# Patient Record
Sex: FEMALE | Race: BLACK OR AFRICAN AMERICAN | NOT HISPANIC OR LATINO | Employment: OTHER | URBAN - METROPOLITAN AREA
[De-identification: names, ages, dates, MRNs, and addresses within clinical notes are randomized per-mention and may not be internally consistent; named-entity substitution may affect disease eponyms.]

---

## 2023-10-06 PROBLEM — H93.19 TINNITUS: Status: ACTIVE | Noted: 2021-05-17

## 2023-10-06 PROBLEM — H92.03 OTALGIA, BILATERAL: Status: ACTIVE | Noted: 2023-10-06

## 2023-10-06 PROBLEM — H90.3 BILATERAL SENSORINEURAL HEARING LOSS: Status: ACTIVE | Noted: 2023-10-06

## 2023-10-06 PROBLEM — M10.9 GOUT: Status: ACTIVE | Noted: 2021-02-19

## 2023-10-06 PROBLEM — R13.12 DYSPHAGIA, OROPHARYNGEAL PHASE: Status: ACTIVE | Noted: 2023-10-06

## 2023-10-06 PROBLEM — R52 BODY ACHES: Status: ACTIVE | Noted: 2023-10-06

## 2023-10-06 PROBLEM — K21.9 GERD (GASTROESOPHAGEAL REFLUX DISEASE): Status: ACTIVE | Noted: 2023-10-06

## 2023-10-06 PROBLEM — F41.1 ANXIETY STATE: Status: ACTIVE | Noted: 2021-03-12

## 2023-10-06 PROBLEM — I10 ESSENTIAL HYPERTENSION: Status: ACTIVE | Noted: 2021-02-19

## 2023-10-06 PROBLEM — E55.9 VITAMIN D DEFICIENCY: Status: ACTIVE | Noted: 2021-02-19

## 2023-10-06 PROBLEM — R07.9 CHEST PAIN: Status: ACTIVE | Noted: 2023-02-23

## 2023-10-06 PROBLEM — R09.81 SINUS CONGESTION: Status: ACTIVE | Noted: 2023-10-06

## 2023-10-06 PROBLEM — R13.10 DYSPHAGIA: Status: ACTIVE | Noted: 2021-02-19

## 2023-10-06 RX ORDER — CHOLECALCIFEROL (VITAMIN D3) 50 MCG
2000 TABLET ORAL DAILY
COMMUNITY
Start: 2020-06-09 | End: 2024-03-13 | Stop reason: SDUPTHER

## 2023-10-06 RX ORDER — PANTOPRAZOLE SODIUM 40 MG/1
40 TABLET, DELAYED RELEASE ORAL DAILY
COMMUNITY
Start: 2022-06-30 | End: 2024-03-13 | Stop reason: SDUPTHER

## 2023-10-06 RX ORDER — ATORVASTATIN CALCIUM 40 MG/1
40 TABLET, FILM COATED ORAL DAILY
COMMUNITY
End: 2024-03-13 | Stop reason: SDUPTHER

## 2023-10-06 RX ORDER — AMLODIPINE BESYLATE 5 MG/1
TABLET ORAL
COMMUNITY
End: 2024-03-13 | Stop reason: SDUPTHER

## 2023-10-06 RX ORDER — LOSARTAN POTASSIUM 50 MG/1
50 TABLET ORAL DAILY
COMMUNITY
Start: 2020-04-09 | End: 2024-03-13

## 2023-10-06 RX ORDER — CLINDAMYCIN HYDROCHLORIDE 150 MG/1
CAPSULE ORAL
COMMUNITY
Start: 2023-03-30 | End: 2023-10-09 | Stop reason: WASHOUT

## 2023-10-06 RX ORDER — HYDROCORTISONE 25 MG/G
OINTMENT TOPICAL
COMMUNITY
Start: 2023-04-18

## 2023-10-06 RX ORDER — IBUPROFEN 800 MG/1
800 TABLET ORAL EVERY 8 HOURS PRN
COMMUNITY
Start: 2022-10-06 | End: 2024-03-13 | Stop reason: SDUPTHER

## 2023-10-06 RX ORDER — MELOXICAM 15 MG/1
1 TABLET ORAL DAILY
COMMUNITY
Start: 2022-06-03

## 2023-10-09 ENCOUNTER — OFFICE VISIT (OUTPATIENT)
Dept: PRIMARY CARE | Facility: CLINIC | Age: 78
End: 2023-10-09
Payer: MEDICARE

## 2023-10-09 VITALS
SYSTOLIC BLOOD PRESSURE: 124 MMHG | TEMPERATURE: 96.7 F | DIASTOLIC BLOOD PRESSURE: 70 MMHG | OXYGEN SATURATION: 98 % | HEART RATE: 68 BPM

## 2023-10-09 DIAGNOSIS — F41.9 ANXIETY: ICD-10-CM

## 2023-10-09 DIAGNOSIS — F03.A4 MILD DEMENTIA WITH ANXIETY, UNSPECIFIED DEMENTIA TYPE (MULTI): Primary | ICD-10-CM

## 2023-10-09 DIAGNOSIS — R21 RASH: ICD-10-CM

## 2023-10-09 PROBLEM — G93.41 ACUTE METABOLIC ENCEPHALOPATHY: Status: ACTIVE | Noted: 2021-12-18

## 2023-10-09 PROBLEM — N30.00 ACUTE CYSTITIS: Status: ACTIVE | Noted: 2019-07-16

## 2023-10-09 PROBLEM — E11.9 DIABETES MELLITUS TYPE 2, NONINSULIN DEPENDENT (MULTI): Status: ACTIVE | Noted: 2021-12-18

## 2023-10-09 PROBLEM — R20.9 ABNORMAL SENSATION OF UPPER EXTREMITY: Status: ACTIVE | Noted: 2018-08-31

## 2023-10-09 PROBLEM — R41.81 AGE-RELATED COGNITIVE DECLINE: Status: ACTIVE | Noted: 2018-09-23

## 2023-10-09 PROCEDURE — 3074F SYST BP LT 130 MM HG: CPT | Performed by: INTERNAL MEDICINE

## 2023-10-09 PROCEDURE — 1159F MED LIST DOCD IN RCRD: CPT | Performed by: INTERNAL MEDICINE

## 2023-10-09 PROCEDURE — 99214 OFFICE O/P EST MOD 30 MIN: CPT | Performed by: INTERNAL MEDICINE

## 2023-10-09 PROCEDURE — 3078F DIAST BP <80 MM HG: CPT | Performed by: INTERNAL MEDICINE

## 2023-10-09 PROCEDURE — 4010F ACE/ARB THERAPY RXD/TAKEN: CPT | Performed by: INTERNAL MEDICINE

## 2023-10-09 PROCEDURE — 1036F TOBACCO NON-USER: CPT | Performed by: INTERNAL MEDICINE

## 2023-10-09 RX ORDER — NAPROXEN 250 MG/1
250 TABLET ORAL
COMMUNITY
Start: 2023-07-26 | End: 2023-10-09 | Stop reason: WASHOUT

## 2023-10-09 RX ORDER — NAPROXEN SODIUM 220 MG/1
81 TABLET, FILM COATED ORAL DAILY
COMMUNITY
Start: 2023-02-02 | End: 2024-02-02

## 2023-10-10 RX ORDER — ESCITALOPRAM OXALATE 5 MG/1
5 TABLET ORAL DAILY
Qty: 30 TABLET | Refills: 2 | Status: SHIPPED | OUTPATIENT
Start: 2023-10-10 | End: 2024-03-13 | Stop reason: SDUPTHER

## 2023-10-10 RX ORDER — DONEPEZIL HYDROCHLORIDE 5 MG/1
5 TABLET, FILM COATED ORAL NIGHTLY
Qty: 30 TABLET | Refills: 0 | Status: SHIPPED | OUTPATIENT
Start: 2023-10-10 | End: 2024-03-13 | Stop reason: WASHOUT

## 2023-10-10 RX ORDER — DONEPEZIL HYDROCHLORIDE 10 MG/1
10 TABLET, FILM COATED ORAL NIGHTLY
Qty: 100 TABLET | Refills: 1 | Status: SHIPPED | OUTPATIENT
Start: 2023-11-10 | End: 2024-03-13 | Stop reason: SDUPTHER

## 2023-10-10 NOTE — PROGRESS NOTES
Subjective   Patient ID: Kimber Oates is a 77 y.o. female who presents for Med Refill (Discsuss meds and followup to colonoscopy).    HPI   Pt is here for f/up  She had a colonoscopy---?reesults    She would like to take meds to help her lose wt - dwp previously that she is not a candidate for this.     C/o rash.  +itchy.     Her short term memory is poor.  Son notes that she thinks that people on tv are speaking to her directly.   Again, she expresses her desire to go to her own home, but she can not live alone.      Son  Pt lives with him   She asks questions over and over.    He feels that ''we are just alike''  continues to argue with her throughout the entire appt.  I explained that her short term memory is limited.    Suggested, again, that he answer her repeated questions   Review of Systems   Skin:  Positive for rash.   All other systems reviewed and are negative.        Objective   /70   Pulse 68   Temp 35.9 °C (96.7 °F)   SpO2 98%     Physical Exam  Constitutional:       Appearance: Normal appearance.   HENT:      Right Ear: Tympanic membrane and ear canal normal.      Left Ear: Tympanic membrane and ear canal normal.      Nose: Nose normal.      Mouth/Throat:      Mouth: Mucous membranes are moist.      Pharynx: Oropharynx is clear.   Cardiovascular:      Rate and Rhythm: Normal rate and regular rhythm.      Heart sounds: Normal heart sounds.   Pulmonary:      Effort: Pulmonary effort is normal.      Breath sounds: Normal breath sounds.   Abdominal:      General: Bowel sounds are normal.      Palpations: Abdomen is soft.   Musculoskeletal:      Cervical back: Normal range of motion and neck supple.   Skin:     General: Skin is warm and dry.      Findings: No rash (there is no rash that i can see or feel.).   Neurological:      Mental Status: She is alert.         Assessment/Plan     Problem List Items Addressed This Visit    None  Visit Diagnoses       Mild dementia with anxiety, unspecified  dementia type (CMS/HCC)    -  Primary    Relevant Medications    donepezil (Aricept) 5 mg tablet    Rash        Anxiety        Relevant Medications    donepezil (Aricept) 10 mg tablet (Start on 11/10/2023)    escitalopram (Lexapro) 5 mg tablet

## 2023-11-15 ENCOUNTER — TELEPHONE (OUTPATIENT)
Dept: PRIMARY CARE | Facility: CLINIC | Age: 78
End: 2023-11-15
Payer: MEDICARE

## 2023-11-15 NOTE — TELEPHONE ENCOUNTER
Constipation issue. This issue on and off since colonoscopy. Uses otc plills on and off but she wants something that's going to work. She thought of the liquid given for colon test but whatever you suggest.

## 2024-03-11 DIAGNOSIS — E55.9 VITAMIN D DEFICIENCY: ICD-10-CM

## 2024-03-11 DIAGNOSIS — E78.2 MIXED HYPERLIPIDEMIA: ICD-10-CM

## 2024-03-11 DIAGNOSIS — I10 ESSENTIAL (PRIMARY) HYPERTENSION: Primary | ICD-10-CM

## 2024-03-11 DIAGNOSIS — F03.A4 MILD DEMENTIA WITH ANXIETY, UNSPECIFIED DEMENTIA TYPE (MULTI): ICD-10-CM

## 2024-03-11 DIAGNOSIS — M19.91 PRIMARY OSTEOARTHRITIS, UNSPECIFIED SITE: ICD-10-CM

## 2024-03-11 DIAGNOSIS — F41.9 ANXIETY: ICD-10-CM

## 2024-03-11 DIAGNOSIS — K21.9 GASTROESOPHAGEAL REFLUX DISEASE WITHOUT ESOPHAGITIS: ICD-10-CM

## 2024-03-13 RX ORDER — AMLODIPINE BESYLATE 5 MG/1
5 TABLET ORAL DAILY
Qty: 100 TABLET | Refills: 1 | Status: SHIPPED | OUTPATIENT
Start: 2024-03-13

## 2024-03-13 RX ORDER — PANTOPRAZOLE SODIUM 40 MG/1
40 TABLET, DELAYED RELEASE ORAL
Qty: 100 TABLET | Refills: 1 | Status: SHIPPED | OUTPATIENT
Start: 2024-03-13

## 2024-03-13 RX ORDER — ATORVASTATIN CALCIUM 40 MG/1
40 TABLET, FILM COATED ORAL DAILY
Qty: 100 TABLET | Refills: 1 | Status: SHIPPED | OUTPATIENT
Start: 2024-03-13

## 2024-03-13 RX ORDER — IBUPROFEN 800 MG/1
800 TABLET ORAL EVERY 8 HOURS PRN
Qty: 150 TABLET | Refills: 1 | Status: SHIPPED | OUTPATIENT
Start: 2024-03-13

## 2024-03-13 RX ORDER — CHOLECALCIFEROL (VITAMIN D3) 50 MCG
2000 TABLET ORAL DAILY
Qty: 100 TABLET | Refills: 1 | Status: SHIPPED | OUTPATIENT
Start: 2024-03-13

## 2024-03-13 RX ORDER — ESCITALOPRAM OXALATE 5 MG/1
5 TABLET ORAL DAILY
Qty: 30 TABLET | Refills: 2 | Status: SHIPPED | OUTPATIENT
Start: 2024-03-13 | End: 2024-06-11

## 2024-03-13 RX ORDER — LOSARTAN POTASSIUM 50 MG/1
50 TABLET ORAL DAILY
Qty: 100 TABLET | Refills: 1 | Status: SHIPPED | OUTPATIENT
Start: 2024-03-13

## 2024-03-13 RX ORDER — DONEPEZIL HYDROCHLORIDE 10 MG/1
10 TABLET, FILM COATED ORAL NIGHTLY
Qty: 100 TABLET | Refills: 1 | Status: SHIPPED | OUTPATIENT
Start: 2024-03-13 | End: 2024-09-29

## 2024-07-08 DIAGNOSIS — F03.A4 MILD DEMENTIA WITH ANXIETY, UNSPECIFIED DEMENTIA TYPE (MULTI): ICD-10-CM

## 2024-07-08 DIAGNOSIS — E78.2 MIXED HYPERLIPIDEMIA: ICD-10-CM

## 2024-07-08 DIAGNOSIS — F41.9 ANXIETY: ICD-10-CM

## 2024-07-08 DIAGNOSIS — K21.9 GASTROESOPHAGEAL REFLUX DISEASE WITHOUT ESOPHAGITIS: ICD-10-CM

## 2024-07-08 DIAGNOSIS — I10 ESSENTIAL (PRIMARY) HYPERTENSION: ICD-10-CM

## 2024-07-08 NOTE — TELEPHONE ENCOUNTER
It looks like she may have switched PCP to [facility] but request for refills came thru the answering service

## 2024-07-12 RX ORDER — AMLODIPINE BESYLATE 5 MG/1
5 TABLET ORAL DAILY
Qty: 30 TABLET | Refills: 0 | Status: SHIPPED | OUTPATIENT
Start: 2024-07-12

## 2024-07-12 RX ORDER — PANTOPRAZOLE SODIUM 40 MG/1
40 TABLET, DELAYED RELEASE ORAL
Qty: 30 TABLET | Refills: 0 | Status: SHIPPED | OUTPATIENT
Start: 2024-07-12

## 2024-07-12 RX ORDER — ATORVASTATIN CALCIUM 40 MG/1
40 TABLET, FILM COATED ORAL DAILY
Qty: 30 TABLET | Refills: 0 | Status: SHIPPED | OUTPATIENT
Start: 2024-07-12

## 2024-07-12 RX ORDER — LOSARTAN POTASSIUM 50 MG/1
50 TABLET ORAL DAILY
Qty: 30 TABLET | Refills: 0 | Status: SHIPPED | OUTPATIENT
Start: 2024-07-12

## 2024-07-12 RX ORDER — DONEPEZIL HYDROCHLORIDE 10 MG/1
10 TABLET, FILM COATED ORAL NIGHTLY
Qty: 30 TABLET | Refills: 0 | Status: SHIPPED | OUTPATIENT
Start: 2024-07-12

## 2024-07-12 NOTE — TELEPHONE ENCOUNTER
Pt has dementia.  Can you call her son or daughter (Roxane Oates) to see if he has switched her care.  I sent a 30 day supply of all of her meds. Thanks

## 2024-07-31 ENCOUNTER — OFFICE VISIT (OUTPATIENT)
Dept: PRIMARY CARE | Facility: CLINIC | Age: 79
End: 2024-07-31
Payer: MEDICARE

## 2024-07-31 ENCOUNTER — HOSPITAL ENCOUNTER (OUTPATIENT)
Dept: RADIOLOGY | Facility: EXTERNAL LOCATION | Age: 79
Discharge: HOME | End: 2024-07-31

## 2024-07-31 VITALS
SYSTOLIC BLOOD PRESSURE: 140 MMHG | HEIGHT: 64 IN | DIASTOLIC BLOOD PRESSURE: 82 MMHG | TEMPERATURE: 97.5 F | BODY MASS INDEX: 28.61 KG/M2 | HEART RATE: 78 BPM | RESPIRATION RATE: 20 BRPM | OXYGEN SATURATION: 98 % | WEIGHT: 167.6 LBS

## 2024-07-31 DIAGNOSIS — M25.562 CHRONIC PAIN OF BOTH KNEES: Primary | ICD-10-CM

## 2024-07-31 DIAGNOSIS — F03.B2 MODERATE DEMENTIA WITH PSYCHOTIC DISTURBANCE, UNSPECIFIED DEMENTIA TYPE (MULTI): ICD-10-CM

## 2024-07-31 DIAGNOSIS — G89.29 CHRONIC PAIN OF BOTH KNEES: Primary | ICD-10-CM

## 2024-07-31 DIAGNOSIS — M25.561 CHRONIC PAIN OF BOTH KNEES: ICD-10-CM

## 2024-07-31 DIAGNOSIS — F03.B4 MODERATE DEMENTIA WITH ANXIETY, UNSPECIFIED DEMENTIA TYPE (MULTI): ICD-10-CM

## 2024-07-31 DIAGNOSIS — M25.562 CHRONIC PAIN OF BOTH KNEES: ICD-10-CM

## 2024-07-31 DIAGNOSIS — M25.561 CHRONIC PAIN OF BOTH KNEES: Primary | ICD-10-CM

## 2024-07-31 DIAGNOSIS — G89.29 CHRONIC PAIN OF BOTH KNEES: ICD-10-CM

## 2024-07-31 PROCEDURE — 99214 OFFICE O/P EST MOD 30 MIN: CPT | Performed by: INTERNAL MEDICINE

## 2024-07-31 PROCEDURE — 3079F DIAST BP 80-89 MM HG: CPT | Performed by: INTERNAL MEDICINE

## 2024-07-31 PROCEDURE — 3077F SYST BP >= 140 MM HG: CPT | Performed by: INTERNAL MEDICINE

## 2024-07-31 PROCEDURE — 1159F MED LIST DOCD IN RCRD: CPT | Performed by: INTERNAL MEDICINE

## 2024-07-31 NOTE — PROGRESS NOTES
Primary Care Physician: Sixto Boggs MD    Date of Visit: 07/31/2024     Chief Complaint:   Chief Complaint   Patient presents with    Follow-up              Subjective:   Kimber Oates is a 78 y.o. female presents     HPI:  HPI  Pt has dementia  She is c/o feeling that something is under her skin of he right gluteus and thing that she can ''pull out''.  Decribed as stips or strings. White in color.  She is also c/o bilat knee pain,    She is becoming more paranoid.  She has called the office a couple of times.  She has also called the police and ambulance.   Daughter and son state that she has gone to the bank several times for new bank/debit cards.   She has accused her daughter of moving her father into the patient's home in [place]    Past Medical History:  Past Medical History:   Diagnosis Date    Personal history of other diseases of the circulatory system     History of hypertension    Personal history of other diseases of the musculoskeletal system and connective tissue     History of arthritis    Personal history of other drug therapy     COVID-19 vaccine series completed        Social History:   reports that she has never smoked. She has never used smokeless tobacco. She reports that she does not drink alcohol and does not use drugs.     Family History:  family history includes No Known Problems in her father and mother.      Allergies:  Allergies   Allergen Reactions    Penicillins Unknown       Outpatient Medications:  Current Outpatient Medications   Medication Instructions    amLODIPine (NORVASC) 5 mg, oral, Daily    atorvastatin (LIPITOR) 40 mg, oral, Daily    cholecalciferol (VITAMIN D-3) 2,000 Units, oral, Daily    donepezil (ARICEPT) 10 mg, oral, Nightly    escitalopram (LEXAPRO) 5 mg, oral, Daily    hydrocortisone 2.5 % ointment     ibuprofen 800 mg, oral, Every 8 hours PRN    losartan (COZAAR) 50 mg, oral, Daily    meloxicam (Mobic) 15 mg tablet 1 tablet, oral, Daily    pantoprazole  "(PROTONIX) 40 mg, oral, Daily before breakfast         ROS:   Review of Systems     Vitals:  /82 (BP Location: Left arm, Patient Position: Sitting, BP Cuff Size: Adult)   Pulse 78   Temp 36.4 °C (97.5 °F) (Temporal)   Resp 20   Ht 1.626 m (5' 4\")   Wt 76 kg (167 lb 9.6 oz)   SpO2 98%   BMI 28.77 kg/m²   Wt Readings from Last 2 Encounters:   07/31/24 76 kg (167 lb 9.6 oz)   06/24/22 92.5 kg (204 lb)       Physical Exam:  Physical Exam  Vitals reviewed.   Constitutional:       Appearance: Normal appearance. She is well-developed and overweight.   HENT:      Head: Normocephalic and atraumatic.      Right Ear: Tympanic membrane and ear canal normal.      Left Ear: Tympanic membrane and ear canal normal.      Nose: Nose normal.      Mouth/Throat:      Mouth: Mucous membranes are moist.      Pharynx: Oropharynx is clear.   Eyes:      Conjunctiva/sclera: Conjunctivae normal.      Pupils: Pupils are equal, round, and reactive to light.   Cardiovascular:      Rate and Rhythm: Normal rate and regular rhythm.      Heart sounds: Normal heart sounds, S1 normal and S2 normal. No murmur heard.  Pulmonary:      Effort: Pulmonary effort is normal.      Breath sounds: Normal breath sounds. No wheezing, rhonchi or rales.   Abdominal:      General: Bowel sounds are normal.      Palpations: Abdomen is soft.   Musculoskeletal:         General: Normal range of motion.      Cervical back: Neck supple.      Right knee: Crepitus present.      Left knee: Crepitus present.   Skin:     General: Skin is warm and dry.      Comments: There is a hyperpigmented area at the previous site of a pressure ulcer.  The skin is intact.    There are no open areas.   Neurological:      Mental Status: She is alert and oriented to person, place, and time.   Psychiatric:         Behavior: Behavior is cooperative.          Assessment/Plan   Diagnoses and all orders for this visit:  Chronic pain of both knees  -     XR knee 1-2 views bilateral; " Future  Moderate dementia with psychotic disturbance, unspecified dementia type (Multi)  Moderate dementia with anxiety, unspecified dementia type (Multi)        Orders:  No orders of the defined types were placed in this encounter.       Followup Appts:  No future appointments.        ____________________________________________________________  Sixto Boggs MD

## 2024-08-26 ENCOUNTER — TELEPHONE (OUTPATIENT)
Dept: PRIMARY CARE | Facility: CLINIC | Age: 79
End: 2024-08-26
Payer: MEDICARE

## 2024-08-26 NOTE — TELEPHONE ENCOUNTER
Patients daughter Roxane called, her mother was hospitalized and would like to personally talk to you.

## 2024-09-03 ENCOUNTER — TELEPHONE (OUTPATIENT)
Dept: PRIMARY CARE | Facility: CLINIC | Age: 79
End: 2024-09-03
Payer: MEDICARE

## 2024-09-03 NOTE — TELEPHONE ENCOUNTER
Gillian Davis Socialworker went out and visited patient and her son.  Patient has been referred to [facility] for assessment scheduled in a few weeks.

## 2024-09-10 PROBLEM — F03.B4: Status: ACTIVE | Noted: 2024-09-10

## 2024-09-10 PROBLEM — G89.29 CHRONIC PAIN OF BOTH KNEES: Status: ACTIVE | Noted: 2024-09-10

## 2024-09-10 PROBLEM — M25.562 CHRONIC PAIN OF BOTH KNEES: Status: ACTIVE | Noted: 2024-09-10

## 2024-09-10 PROBLEM — F03.B2: Status: ACTIVE | Noted: 2024-09-10

## 2024-09-10 PROBLEM — M25.561 CHRONIC PAIN OF BOTH KNEES: Status: ACTIVE | Noted: 2024-09-10

## 2024-10-29 PROCEDURE — G0179 MD RECERTIFICATION HHA PT: HCPCS | Performed by: INTERNAL MEDICINE
